# Patient Record
Sex: FEMALE | Race: WHITE | NOT HISPANIC OR LATINO | Employment: OTHER | URBAN - NONMETROPOLITAN AREA
[De-identification: names, ages, dates, MRNs, and addresses within clinical notes are randomized per-mention and may not be internally consistent; named-entity substitution may affect disease eponyms.]

---

## 2023-07-17 PROBLEM — M43.02 CERVICAL SPONDYLOLYSIS: Status: ACTIVE | Noted: 2023-07-17

## 2023-07-17 PROBLEM — E78.49 FAMILIAL HYPERLIPIDEMIA: Status: ACTIVE | Noted: 2023-07-17

## 2023-07-17 PROBLEM — F32.1 DEPRESSION, MAJOR, SINGLE EPISODE, MODERATE (MULTI): Status: ACTIVE | Noted: 2023-07-17

## 2023-07-17 PROBLEM — M47.16 SPONDYLOSIS, LUMBAR, WITH MYELOPATHY: Status: ACTIVE | Noted: 2023-07-17

## 2023-07-17 PROBLEM — R53.83 FATIGUE: Status: ACTIVE | Noted: 2023-07-17

## 2023-07-17 PROBLEM — K58.9 IBS (IRRITABLE BOWEL SYNDROME): Status: ACTIVE | Noted: 2023-07-17

## 2023-07-17 PROBLEM — K21.9 GERD (GASTROESOPHAGEAL REFLUX DISEASE): Status: ACTIVE | Noted: 2023-07-17

## 2023-07-17 PROBLEM — G62.9 PERIPHERAL NEUROPATHY: Status: ACTIVE | Noted: 2023-07-17

## 2023-07-17 PROBLEM — E04.1 THYROID NODULE: Status: ACTIVE | Noted: 2023-07-17

## 2023-07-17 PROBLEM — M62.50 MUSCLE ATROPHY: Status: ACTIVE | Noted: 2023-07-17

## 2023-07-17 PROBLEM — M19.049 OSTEOARTHRITIS OF CARPOMETACARPAL JOINT: Status: ACTIVE | Noted: 2023-07-17

## 2023-07-17 PROBLEM — M54.2 NECK PAIN: Status: ACTIVE | Noted: 2023-07-17

## 2023-07-17 PROBLEM — R60.9 EDEMA: Status: ACTIVE | Noted: 2023-07-17

## 2023-07-17 PROBLEM — J30.9 CHRONIC ALLERGIC RHINITIS: Status: ACTIVE | Noted: 2023-07-17

## 2023-07-17 PROBLEM — M85.80 OSTEOPENIA: Status: ACTIVE | Noted: 2023-07-17

## 2023-07-17 PROBLEM — I83.93 VARICOSE VEINS OF LEGS: Status: ACTIVE | Noted: 2023-07-17

## 2023-07-17 RX ORDER — CYCLOBENZAPRINE HCL 5 MG
5 TABLET ORAL NIGHTLY
COMMUNITY
Start: 2022-09-06 | End: 2024-02-05 | Stop reason: ALTCHOICE

## 2023-07-17 RX ORDER — MULTIVITAMIN
1 TABLET ORAL DAILY
COMMUNITY
Start: 2019-11-09

## 2023-07-17 RX ORDER — CALCIUM CARBONATE 600 MG
1 TABLET ORAL DAILY
COMMUNITY
Start: 2020-07-21 | End: 2024-02-05 | Stop reason: ALTCHOICE

## 2023-07-17 RX ORDER — CHOLECALCIFEROL (VITAMIN D3) 25 MCG
1 TABLET ORAL DAILY
COMMUNITY
Start: 2020-07-21

## 2023-07-17 RX ORDER — MULTIVIT-MIN/IRON/FOLIC ACID/K 18-600-40
1 CAPSULE ORAL DAILY
COMMUNITY
Start: 2019-11-09

## 2023-07-17 RX ORDER — LANOLIN ALCOHOL/MO/W.PET/CERES
1 CREAM (GRAM) TOPICAL DAILY
COMMUNITY
Start: 2020-07-21

## 2023-07-17 RX ORDER — FAMOTIDINE 40 MG/1
1 TABLET, FILM COATED ORAL NIGHTLY
COMMUNITY
Start: 2020-07-21 | End: 2023-08-04 | Stop reason: SDUPTHER

## 2023-07-18 ENCOUNTER — APPOINTMENT (OUTPATIENT)
Dept: PRIMARY CARE | Facility: CLINIC | Age: 73
End: 2023-07-18
Payer: MEDICARE

## 2023-08-04 ENCOUNTER — OFFICE VISIT (OUTPATIENT)
Dept: PRIMARY CARE | Facility: CLINIC | Age: 73
End: 2023-08-04
Payer: MEDICARE

## 2023-08-04 VITALS
OXYGEN SATURATION: 95 % | SYSTOLIC BLOOD PRESSURE: 110 MMHG | HEIGHT: 65 IN | BODY MASS INDEX: 22.82 KG/M2 | HEART RATE: 66 BPM | WEIGHT: 137 LBS | DIASTOLIC BLOOD PRESSURE: 70 MMHG

## 2023-08-04 DIAGNOSIS — K58.9 IRRITABLE BOWEL SYNDROME WITHOUT DIARRHEA: ICD-10-CM

## 2023-08-04 DIAGNOSIS — M43.02 CERVICAL SPONDYLOLYSIS: ICD-10-CM

## 2023-08-04 DIAGNOSIS — K21.9 GASTROESOPHAGEAL REFLUX DISEASE, UNSPECIFIED WHETHER ESOPHAGITIS PRESENT: ICD-10-CM

## 2023-08-04 DIAGNOSIS — G62.89 OTHER POLYNEUROPATHY: ICD-10-CM

## 2023-08-04 DIAGNOSIS — R60.0 LOCALIZED EDEMA: ICD-10-CM

## 2023-08-04 DIAGNOSIS — R53.82 CHRONIC FATIGUE: ICD-10-CM

## 2023-08-04 DIAGNOSIS — Z12.31 SCREENING MAMMOGRAM, ENCOUNTER FOR: ICD-10-CM

## 2023-08-04 DIAGNOSIS — M62.521 ATROPHY OF MUSCLE OF RIGHT UPPER ARM: ICD-10-CM

## 2023-08-04 DIAGNOSIS — M47.16 SPONDYLOSIS, LUMBAR, WITH MYELOPATHY: ICD-10-CM

## 2023-08-04 DIAGNOSIS — M85.80 OSTEOPENIA, UNSPECIFIED LOCATION: ICD-10-CM

## 2023-08-04 DIAGNOSIS — J30.9 CHRONIC ALLERGIC RHINITIS: Primary | ICD-10-CM

## 2023-08-04 DIAGNOSIS — M19.049 OSTEOARTHRITIS OF CARPOMETACARPAL JOINT: ICD-10-CM

## 2023-08-04 DIAGNOSIS — E78.49 FAMILIAL HYPERLIPIDEMIA: ICD-10-CM

## 2023-08-04 PROBLEM — M43.10 ACQUIRED SPONDYLOLISTHESIS: Status: ACTIVE | Noted: 2019-10-03

## 2023-08-04 PROBLEM — F32.1 DEPRESSION, MAJOR, SINGLE EPISODE, MODERATE (MULTI): Status: RESOLVED | Noted: 2023-07-17 | Resolved: 2023-08-04

## 2023-08-04 PROBLEM — H43.393 VITREOUS FLOATERS OF BOTH EYES: Status: ACTIVE | Noted: 2017-01-25

## 2023-08-04 PROBLEM — M54.2 NECK PAIN: Status: RESOLVED | Noted: 2023-07-17 | Resolved: 2023-08-04

## 2023-08-04 PROBLEM — M43.17 ACQUIRED SPONDYLOLISTHESIS OF LUMBOSACRAL REGION: Status: ACTIVE | Noted: 2019-10-03

## 2023-08-04 PROBLEM — R60.9 EDEMA: Status: RESOLVED | Noted: 2023-07-17 | Resolved: 2023-08-04

## 2023-08-04 PROCEDURE — 1160F RVW MEDS BY RX/DR IN RCRD: CPT | Performed by: FAMILY MEDICINE

## 2023-08-04 PROCEDURE — 1036F TOBACCO NON-USER: CPT | Performed by: FAMILY MEDICINE

## 2023-08-04 PROCEDURE — 1159F MED LIST DOCD IN RCRD: CPT | Performed by: FAMILY MEDICINE

## 2023-08-04 PROCEDURE — 1157F ADVNC CARE PLAN IN RCRD: CPT | Performed by: FAMILY MEDICINE

## 2023-08-04 PROCEDURE — 99214 OFFICE O/P EST MOD 30 MIN: CPT | Performed by: FAMILY MEDICINE

## 2023-08-04 RX ORDER — FAMOTIDINE 40 MG/1
40 TABLET, FILM COATED ORAL NIGHTLY
Qty: 90 TABLET | Refills: 3 | Status: SHIPPED | OUTPATIENT
Start: 2023-08-04 | End: 2024-08-03

## 2023-08-04 RX ORDER — AMOXICILLIN 250 MG
1 CAPSULE ORAL DAILY PRN
COMMUNITY
End: 2024-02-05 | Stop reason: ALTCHOICE

## 2023-08-04 ASSESSMENT — PATIENT HEALTH QUESTIONNAIRE - PHQ9
2. FEELING DOWN, DEPRESSED OR HOPELESS: NOT AT ALL
SUM OF ALL RESPONSES TO PHQ9 QUESTIONS 1 AND 2: 0
1. LITTLE INTEREST OR PLEASURE IN DOING THINGS: NOT AT ALL

## 2023-08-04 NOTE — PROGRESS NOTES
Subjective   Patient ID: Meron Silverio is a 72 y.o. female who presents for Med Management.    HPI   Additional Information: [facility] Surrogate.   Cervical spondylosis with myelopathy - has been crunchy as usual. Some pain in shoulders. More of all her pains. Surgery made her pills hard to swallow.  Last month had a spell of spasms.       Chronic rhinitis - not an issue . No meds except saline every night     EDEMA - good in winter but puffy in summer heat.      Familial hyperlipidemia - she was on a low calorie diet and had TC of 229 but HDL of 91 ASCVD risk 6.7%. Stopped  [facility] due to back then COVID-19. Will get back but so far not able to get in or walking. Now exercises and weights at home daily. Gardening and weeding.       Fatigue - still exhausted. Does not sleep well with the pain and potty breaks. Good TSH last time.      Generalized osteoarthrosis, involving multiple sites - all pains seem to be worse with staying active. Hands are the worst. Dr Mendoza and will have CMC  arthroplasty on both hands but holding for now as that is the least of her pains. .      GERD - No HB, Melena, dysphagia, or hematochezia. On famotidine daily works well.      History of melanoma MARY in August and annual checks at [facility].      Irritable bowel syndrome - has been good with some increase in gas and diet is better. Mostly eats and then has a build up that passes and she is fine till next meal.       Muscle atrophy - Still weak in right arm and hands. Feels like muscle have crepitance. Does not seem like the weights have been helping but may be maintaining .      Osteopenia - was to be on Boniva in the past and could not take it. Jaw pain. DEXA in August 19, 2022 and 7.7/ 0.7% FRAX      Peripheral Neuropathy - feet getting hot and burning. Worse if in shoes. Not red or swollen or warm except with veins at ankles. Gabapentin made her loopy. Not keeping awake at hs      Personal history of colonic  "polyps - scope July 2019. 2 polyps. Due in 2024.      Spondylosis, lumbar, with myelopathy - Saw Dr Lopez then Dr Cassidy. PT really helped for spinal stenosis. She will continue exercises. Has spondylolisthesis.     Thyroid nodule - good TSH last  time. Got smaller in 2014      Varicose veins of legs - still has them and so purple ankles      Memory loss - grasping for words. OK with dates. Names are OK      Mammogram 8/19/22   DEXA 8/19/22  Colonoscopy 7/10/19   Pneumovax UTD     Review of Systems    Objective   /70 (BP Location: Left arm, Patient Position: Sitting)   Pulse 66   Ht 1.657 m (5' 5.25\")   Wt 62.1 kg (137 lb)   SpO2 95%   BMI 22.62 kg/m²     Physical Exam  Vitals reviewed.   Constitutional:       General: She is not in acute distress.     Appearance: Normal appearance.   HENT:      Head: Normocephalic.      Right Ear: Tympanic membrane, ear canal and external ear normal.      Left Ear: Tympanic membrane, ear canal and external ear normal.      Nose: Nose normal.      Mouth/Throat:      Pharynx: Oropharynx is clear.   Eyes:      Extraocular Movements: Extraocular movements intact.      Conjunctiva/sclera: Conjunctivae normal.      Pupils: Pupils are equal, round, and reactive to light.   Neck:      Vascular: No carotid bruit.   Cardiovascular:      Rate and Rhythm: Normal rate and regular rhythm.      Pulses: Normal pulses.      Heart sounds: Normal heart sounds. No murmur heard.  Pulmonary:      Effort: Pulmonary effort is normal. No respiratory distress.      Breath sounds: Normal breath sounds.   Abdominal:      General: Abdomen is flat. Bowel sounds are normal. There is no distension.      Palpations: Abdomen is soft. There is no mass.      Tenderness: There is no abdominal tenderness.   Musculoskeletal:         General: Tenderness (paracervical and lumbar with tightness of traps) and deformity (Heberden's nodes of multiple fingers and bony prominence of the CMC) present. Normal range of " motion.      Cervical back: Normal range of motion and neck supple. No tenderness.      Comments: Crepitence of neck    Lymphadenopathy:      Cervical: No cervical adenopathy.   Skin:     General: Skin is warm and dry.      Findings: No rash.   Neurological:      General: No focal deficit present.      Mental Status: She is alert and oriented to person, place, and time.   Psychiatric:         Mood and Affect: Mood normal.         Thought Content: Thought content normal.         Judgment: Judgment normal.         Assessment/Plan   Problem List Items Addressed This Visit       Cervical spondylolysis    Relevant Orders    Follow Up In Primary Care - Established    Chronic allergic rhinitis - Primary    Familial hyperlipidemia    Relevant Orders    Lipid Panel    TSH with reflex to Free T4 if abnormal    Fatigue    GERD (gastroesophageal reflux disease)    Relevant Medications    famotidine (Pepcid) 40 mg tablet    IBS (irritable bowel syndrome)    Relevant Orders    Comprehensive Metabolic Panel    CBC    Localized edema    Muscle atrophy    Osteoarthritis of carpometacarpal joint    Osteopenia    Peripheral neuropathy    Spondylosis, lumbar, with myelopathy     Other Visit Diagnoses       Screening mammogram, encounter for        Relevant Orders    BI mammo bilateral screening tomosynthesis

## 2023-08-30 ENCOUNTER — TELEPHONE (OUTPATIENT)
Dept: PRIMARY CARE | Facility: CLINIC | Age: 73
End: 2023-08-30
Payer: MEDICARE

## 2024-01-31 ENCOUNTER — LAB (OUTPATIENT)
Dept: LAB | Facility: LAB | Age: 74
End: 2024-01-31
Payer: MEDICARE

## 2024-01-31 DIAGNOSIS — K58.9 IRRITABLE BOWEL SYNDROME WITHOUT DIARRHEA: ICD-10-CM

## 2024-01-31 DIAGNOSIS — E78.49 FAMILIAL HYPERLIPIDEMIA: ICD-10-CM

## 2024-01-31 LAB
ALBUMIN SERPL BCP-MCNC: 4.2 G/DL (ref 3.4–5)
ALP SERPL-CCNC: 86 U/L (ref 33–136)
ALT SERPL W P-5'-P-CCNC: 17 U/L (ref 7–45)
ANION GAP SERPL CALC-SCNC: 9 MMOL/L (ref 10–20)
AST SERPL W P-5'-P-CCNC: 19 U/L (ref 9–39)
BILIRUB SERPL-MCNC: 0.5 MG/DL (ref 0–1.2)
BUN SERPL-MCNC: 13 MG/DL (ref 6–23)
CALCIUM SERPL-MCNC: 9.5 MG/DL (ref 8.6–10.3)
CHLORIDE SERPL-SCNC: 106 MMOL/L (ref 98–107)
CHOLEST SERPL-MCNC: 229 MG/DL (ref 0–199)
CHOLESTEROL/HDL RATIO: 2.7
CO2 SERPL-SCNC: 31 MMOL/L (ref 21–32)
CREAT SERPL-MCNC: 0.78 MG/DL (ref 0.5–1.05)
EGFRCR SERPLBLD CKD-EPI 2021: 81 ML/MIN/1.73M*2
ERYTHROCYTE [DISTWIDTH] IN BLOOD BY AUTOMATED COUNT: 12.6 % (ref 11.5–14.5)
GLUCOSE SERPL-MCNC: 88 MG/DL (ref 74–99)
HCT VFR BLD AUTO: 42.7 % (ref 36–46)
HDLC SERPL-MCNC: 84 MG/DL
HGB BLD-MCNC: 13.7 G/DL (ref 12–16)
LDLC SERPL CALC-MCNC: 130 MG/DL
MCH RBC QN AUTO: 31.1 PG (ref 26–34)
MCHC RBC AUTO-ENTMCNC: 32.1 G/DL (ref 32–36)
MCV RBC AUTO: 97 FL (ref 80–100)
NON HDL CHOLESTEROL: 145 MG/DL (ref 0–149)
NRBC BLD-RTO: 0 /100 WBCS (ref 0–0)
PLATELET # BLD AUTO: 345 X10*3/UL (ref 150–450)
POTASSIUM SERPL-SCNC: 4.2 MMOL/L (ref 3.5–5.3)
PROT SERPL-MCNC: 7 G/DL (ref 6.4–8.2)
RBC # BLD AUTO: 4.41 X10*6/UL (ref 4–5.2)
SODIUM SERPL-SCNC: 142 MMOL/L (ref 136–145)
TRIGL SERPL-MCNC: 74 MG/DL (ref 0–149)
TSH SERPL-ACNC: 1.45 MIU/L (ref 0.44–3.98)
VLDL: 15 MG/DL (ref 0–40)
WBC # BLD AUTO: 6.1 X10*3/UL (ref 4.4–11.3)

## 2024-01-31 PROCEDURE — 85027 COMPLETE CBC AUTOMATED: CPT

## 2024-01-31 PROCEDURE — 80053 COMPREHEN METABOLIC PANEL: CPT

## 2024-01-31 PROCEDURE — 80061 LIPID PANEL: CPT

## 2024-01-31 PROCEDURE — 84443 ASSAY THYROID STIM HORMONE: CPT

## 2024-01-31 PROCEDURE — 36415 COLL VENOUS BLD VENIPUNCTURE: CPT

## 2024-02-05 ENCOUNTER — OFFICE VISIT (OUTPATIENT)
Dept: PRIMARY CARE | Facility: CLINIC | Age: 74
End: 2024-02-05
Payer: MEDICARE

## 2024-02-05 VITALS
OXYGEN SATURATION: 95 % | BODY MASS INDEX: 22.98 KG/M2 | HEIGHT: 66 IN | SYSTOLIC BLOOD PRESSURE: 130 MMHG | WEIGHT: 143 LBS | HEART RATE: 55 BPM | DIASTOLIC BLOOD PRESSURE: 80 MMHG

## 2024-02-05 DIAGNOSIS — M43.02 CERVICAL SPONDYLOLYSIS: ICD-10-CM

## 2024-02-05 DIAGNOSIS — E78.49 FAMILIAL HYPERLIPIDEMIA: ICD-10-CM

## 2024-02-05 DIAGNOSIS — M62.521 ATROPHY OF MUSCLE OF RIGHT UPPER ARM: ICD-10-CM

## 2024-02-05 DIAGNOSIS — I83.93 ASYMPTOMATIC VARICOSE VEINS OF BOTH LOWER EXTREMITIES: ICD-10-CM

## 2024-02-05 DIAGNOSIS — E04.1 THYROID NODULE: ICD-10-CM

## 2024-02-05 DIAGNOSIS — K58.9 IRRITABLE BOWEL SYNDROME WITHOUT DIARRHEA: ICD-10-CM

## 2024-02-05 DIAGNOSIS — M43.17 ACQUIRED SPONDYLOLISTHESIS OF LUMBOSACRAL REGION: ICD-10-CM

## 2024-02-05 DIAGNOSIS — R53.82 CHRONIC FATIGUE: ICD-10-CM

## 2024-02-05 DIAGNOSIS — Z00.00 ROUTINE GENERAL MEDICAL EXAMINATION AT HEALTH CARE FACILITY: Primary | ICD-10-CM

## 2024-02-05 DIAGNOSIS — Z87.891 FORMER SMOKER: ICD-10-CM

## 2024-02-05 DIAGNOSIS — M85.80 OSTEOPENIA, UNSPECIFIED LOCATION: ICD-10-CM

## 2024-02-05 DIAGNOSIS — M47.16 SPONDYLOSIS, LUMBAR, WITH MYELOPATHY: ICD-10-CM

## 2024-02-05 DIAGNOSIS — G62.89 OTHER POLYNEUROPATHY: ICD-10-CM

## 2024-02-05 DIAGNOSIS — M19.049 OSTEOARTHRITIS OF CARPOMETACARPAL JOINT: ICD-10-CM

## 2024-02-05 DIAGNOSIS — K21.9 GASTROESOPHAGEAL REFLUX DISEASE WITHOUT ESOPHAGITIS: ICD-10-CM

## 2024-02-05 DIAGNOSIS — J30.9 CHRONIC ALLERGIC RHINITIS: ICD-10-CM

## 2024-02-05 DIAGNOSIS — R60.0 LOCALIZED EDEMA: ICD-10-CM

## 2024-02-05 PROBLEM — H43.393 VITREOUS FLOATERS OF BOTH EYES: Status: RESOLVED | Noted: 2017-01-25 | Resolved: 2024-02-05

## 2024-02-05 PROCEDURE — 1036F TOBACCO NON-USER: CPT | Performed by: FAMILY MEDICINE

## 2024-02-05 PROCEDURE — 1158F ADVNC CARE PLAN TLK DOCD: CPT | Performed by: FAMILY MEDICINE

## 2024-02-05 PROCEDURE — G0439 PPPS, SUBSEQ VISIT: HCPCS | Performed by: FAMILY MEDICINE

## 2024-02-05 PROCEDURE — 1159F MED LIST DOCD IN RCRD: CPT | Performed by: FAMILY MEDICINE

## 2024-02-05 PROCEDURE — 99214 OFFICE O/P EST MOD 30 MIN: CPT | Performed by: FAMILY MEDICINE

## 2024-02-05 PROCEDURE — 1170F FXNL STATUS ASSESSED: CPT | Performed by: FAMILY MEDICINE

## 2024-02-05 PROCEDURE — 1157F ADVNC CARE PLAN IN RCRD: CPT | Performed by: FAMILY MEDICINE

## 2024-02-05 PROCEDURE — 1160F RVW MEDS BY RX/DR IN RCRD: CPT | Performed by: FAMILY MEDICINE

## 2024-02-05 PROCEDURE — 1123F ACP DISCUSS/DSCN MKR DOCD: CPT | Performed by: FAMILY MEDICINE

## 2024-02-05 ASSESSMENT — ACTIVITIES OF DAILY LIVING (ADL)
DRESSING: INDEPENDENT
MANAGING_FINANCES: INDEPENDENT
GROCERY_SHOPPING: INDEPENDENT
DOING_HOUSEWORK: INDEPENDENT
BATHING: INDEPENDENT
TAKING_MEDICATION: INDEPENDENT

## 2024-02-05 ASSESSMENT — PATIENT HEALTH QUESTIONNAIRE - PHQ9
SUM OF ALL RESPONSES TO PHQ9 QUESTIONS 1 AND 2: 0
1. LITTLE INTEREST OR PLEASURE IN DOING THINGS: NOT AT ALL
SUM OF ALL RESPONSES TO PHQ9 QUESTIONS 1 AND 2: 0
1. LITTLE INTEREST OR PLEASURE IN DOING THINGS: NOT AT ALL
2. FEELING DOWN, DEPRESSED OR HOPELESS: NOT AT ALL
2. FEELING DOWN, DEPRESSED OR HOPELESS: NOT AT ALL

## 2024-02-05 NOTE — PROGRESS NOTES
Subjective   Reason for Visit: Meron Silverio is an 73 y.o. female here for a Medicare Wellness visit.     Past Medical, Surgical, and Family History reviewed and updated in chart.    Reviewed all medications by prescribing practitioner or clinical pharmacist (such as prescriptions, OTCs, herbal therapies and supplements) and documented in the medical record.    HPI  Cervical spondylosis with myelopathy - has been crunchy as usual. Some pain in shoulders. More of all her pains. Surgery made her pills hard to swallow.  Last month had a spell of sharp pains. Can try Salon Pas or Blue Emu     Chronic rhinitis - not an issue . No meds except saline every night     EDEMA - good in winter but puffy in summer heat. CMP WNL. Abraded the leg 3 weeks ago and slow healing.      Familial hyperlipidemia -  TC of 229 but HDL of 84  ASCVD risk 7.7%%.  Exercises and weights at home daily. Gardening and weeding in the summer.        Fatigue - still exhausted. Does not sleep well with the pain and potty breaks. Good TSH last year      Generalized osteoarthrosis, involving multiple sites - all pains seem to be worse with staying active or weather. Hands are the worst. Dr Mendoza and will have C[facility]  arthroplasty on both hands but holding for now as that is the least of her pains. .      GERD - No HB, Melena, dysphagia, or hematochezia. On famotidine daily works well.      History of melanoma MARY in August and annual checks at [facility].      Irritable bowel syndrome - has been good with some increase in gas and diet is better. Mostly eats and then has a build up that passes and she is fine till next meal.       Muscle atrophy - Still weak in right arm and hands. Feels like muscle have crepitance. Does not seem like the weights have been helping but may be maintaining.     Osteopenia - was to be on Boniva in the past and could not take it. Jaw pain. DEXA in August 19, 2022 and 7.7/ 0.7% FRAX so not needed      Peripheral Neuropathy  "- feet getting hot and burning. Worse if in shoes. Not red or swollen or warm except with veins at ankles. Gabapentin made her loopy. Not keeping awake at hs      Personal history of colonic polyps - scope July 2019. 2 polyps. Due in 2024.      Spondylosis, lumbar, with myelopathy - Saw Dr Lopez then Dr Cassidy. PT really helped for spinal stenosis. She will continue exercises. Has spondylolisthesis.     Thyroid nodule - good TSH.   Got smaller in 2014      Varicose veins of legs - still has them and so purple ankles      Memory loss - grasping for words. OK with dates. Names can be an issue.      Mammogram 8/4/23   DEXA 8/19/22  Colonoscopy 7/10/19   Pneumovax UTD   LW and DPA  Bijan yes   CT screening  lung 2/7/23 for 40 PY Quit 2013     Patient Care Team:  Jay Espinosa MD as PCP - General  Jay Espinosa MD as PCP - [facility] ACO Attributed Provider     Review of Systems    Objective   Vitals:  /80 (BP Location: Left arm, Patient Position: Sitting)   Pulse 55   Ht 1.664 m (5' 5.5\")   Wt 64.9 kg (143 lb)   SpO2 95%   BMI 23.43 kg/m²       Physical Exam  Vitals reviewed.   Constitutional:       General: She is not in acute distress.     Appearance: Normal appearance.   HENT:      Head: Normocephalic.      Right Ear: Tympanic membrane, ear canal and external ear normal.      Left Ear: Tympanic membrane, ear canal and external ear normal.      Nose: Nose normal.      Mouth/Throat:      Pharynx: Oropharynx is clear.   Eyes:      Extraocular Movements: Extraocular movements intact.      Conjunctiva/sclera: Conjunctivae normal.      Pupils: Pupils are equal, round, and reactive to light.   Neck:      Vascular: No carotid bruit.   Cardiovascular:      Rate and Rhythm: Normal rate and regular rhythm.      Pulses: Normal pulses.      Heart sounds: Normal heart sounds. No murmur heard.  Pulmonary:      Effort: Pulmonary effort is normal. No respiratory distress.      Breath sounds: Normal breath sounds. "   Abdominal:      General: Abdomen is flat. Bowel sounds are normal. There is no distension.      Palpations: Abdomen is soft. There is no mass.      Tenderness: There is no abdominal tenderness.   Musculoskeletal:      Cervical back: Normal range of motion and neck supple. No tenderness.      Comments: Heberdens with inturning of little left and both index fingers   Neck with with good ROM but pain with rotation left. Crepitence with motion of right knee    Lymphadenopathy:      Cervical: No cervical adenopathy.   Skin:     General: Skin is warm and dry.      Findings: No rash.   Neurological:      General: No focal deficit present.      Mental Status: She is alert and oriented to person, place, and time.   Psychiatric:         Mood and Affect: Mood normal.         Thought Content: Thought content normal.         Judgment: Judgment normal.         Assessment/Plan   Problem List Items Addressed This Visit       Acquired spondylolisthesis of lumbosacral region    Cervical spondylolysis    Relevant Orders    Follow Up In Primary Care - Established    Chronic allergic rhinitis    Familial hyperlipidemia    Fatigue    GERD (gastroesophageal reflux disease)    IBS (irritable bowel syndrome)    Localized edema    Muscle atrophy    Osteoarthritis of carpometacarpal joint    Osteopenia    Peripheral neuropathy    Spondylosis, lumbar, with myelopathy    Thyroid nodule    Varicose veins of legs     Other Visit Diagnoses       Routine general medical examination at health care facility    -  Primary    Former smoker        Relevant Orders    CT lung screening low dose

## 2024-02-13 ENCOUNTER — HOSPITAL ENCOUNTER (OUTPATIENT)
Dept: RADIOLOGY | Facility: HOSPITAL | Age: 74
Discharge: HOME | End: 2024-02-13
Payer: MEDICARE

## 2024-02-13 DIAGNOSIS — Z87.891 FORMER SMOKER: ICD-10-CM

## 2024-02-13 PROCEDURE — 71271 CT THORAX LUNG CANCER SCR C-: CPT

## 2024-08-05 ENCOUNTER — APPOINTMENT (OUTPATIENT)
Dept: PRIMARY CARE | Facility: CLINIC | Age: 74
End: 2024-08-05
Payer: MEDICARE

## 2024-08-05 VITALS
WEIGHT: 141 LBS | OXYGEN SATURATION: 97 % | BODY MASS INDEX: 22.66 KG/M2 | HEART RATE: 60 BPM | SYSTOLIC BLOOD PRESSURE: 130 MMHG | DIASTOLIC BLOOD PRESSURE: 80 MMHG | HEIGHT: 66 IN

## 2024-08-05 DIAGNOSIS — M19.049 OSTEOARTHRITIS OF CARPOMETACARPAL JOINT: ICD-10-CM

## 2024-08-05 DIAGNOSIS — Z12.31 SCREENING MAMMOGRAM, ENCOUNTER FOR: ICD-10-CM

## 2024-08-05 DIAGNOSIS — M62.521 ATROPHY OF MUSCLE OF RIGHT UPPER ARM: ICD-10-CM

## 2024-08-05 DIAGNOSIS — K21.9 GASTROESOPHAGEAL REFLUX DISEASE, UNSPECIFIED WHETHER ESOPHAGITIS PRESENT: ICD-10-CM

## 2024-08-05 DIAGNOSIS — M85.80 OSTEOPENIA, UNSPECIFIED LOCATION: ICD-10-CM

## 2024-08-05 DIAGNOSIS — R53.82 CHRONIC FATIGUE: ICD-10-CM

## 2024-08-05 DIAGNOSIS — J30.9 CHRONIC ALLERGIC RHINITIS: ICD-10-CM

## 2024-08-05 DIAGNOSIS — M43.17 ACQUIRED SPONDYLOLISTHESIS OF LUMBOSACRAL REGION: ICD-10-CM

## 2024-08-05 DIAGNOSIS — K58.9 IRRITABLE BOWEL SYNDROME WITHOUT DIARRHEA: ICD-10-CM

## 2024-08-05 DIAGNOSIS — E78.49 FAMILIAL HYPERLIPIDEMIA: ICD-10-CM

## 2024-08-05 DIAGNOSIS — R60.0 LOCALIZED EDEMA: Primary | ICD-10-CM

## 2024-08-05 DIAGNOSIS — Z78.0 MENOPAUSE: ICD-10-CM

## 2024-08-05 DIAGNOSIS — E04.1 THYROID NODULE: ICD-10-CM

## 2024-08-05 DIAGNOSIS — Z87.891 FORMER SMOKER: ICD-10-CM

## 2024-08-05 DIAGNOSIS — M47.16 SPONDYLOSIS, LUMBAR, WITH MYELOPATHY: ICD-10-CM

## 2024-08-05 DIAGNOSIS — M43.02 CERVICAL SPONDYLOLYSIS: ICD-10-CM

## 2024-08-05 DIAGNOSIS — I83.93 ASYMPTOMATIC VARICOSE VEINS OF BOTH LOWER EXTREMITIES: ICD-10-CM

## 2024-08-05 PROCEDURE — 1157F ADVNC CARE PLAN IN RCRD: CPT | Performed by: FAMILY MEDICINE

## 2024-08-05 PROCEDURE — 1160F RVW MEDS BY RX/DR IN RCRD: CPT | Performed by: FAMILY MEDICINE

## 2024-08-05 PROCEDURE — 3008F BODY MASS INDEX DOCD: CPT | Performed by: FAMILY MEDICINE

## 2024-08-05 PROCEDURE — 1036F TOBACCO NON-USER: CPT | Performed by: FAMILY MEDICINE

## 2024-08-05 PROCEDURE — 1159F MED LIST DOCD IN RCRD: CPT | Performed by: FAMILY MEDICINE

## 2024-08-05 PROCEDURE — 99214 OFFICE O/P EST MOD 30 MIN: CPT | Performed by: FAMILY MEDICINE

## 2024-08-05 RX ORDER — FAMOTIDINE 40 MG/1
40 TABLET, FILM COATED ORAL NIGHTLY
Qty: 90 TABLET | Refills: 3 | Status: SHIPPED | OUTPATIENT
Start: 2024-08-05 | End: 2025-08-05

## 2024-08-05 RX ORDER — FUROSEMIDE 20 MG/1
20 TABLET ORAL DAILY PRN
Qty: 30 TABLET | Refills: 2 | Status: SHIPPED | OUTPATIENT
Start: 2024-08-05 | End: 2025-08-05

## 2024-08-05 ASSESSMENT — PATIENT HEALTH QUESTIONNAIRE - PHQ9
1. LITTLE INTEREST OR PLEASURE IN DOING THINGS: NOT AT ALL
2. FEELING DOWN, DEPRESSED OR HOPELESS: NOT AT ALL
SUM OF ALL RESPONSES TO PHQ9 QUESTIONS 1 AND 2: 0

## 2024-08-05 NOTE — PROGRESS NOTES
Subjective   Patient ID: Meron Silverio is a 73 y.o. female who presents for Med Management (Feet swelling).    HPI   Cervical spondylosis with myelopathy - has been crunchy as usual with  pain in shoulders. More of all her pains. Surgery made her pills hard to swallow that is better now.  Last month had a spell of sharp pains.  Salon Pas  as needed. Using Blue Emu     Chronic rhinitis - not an issue. No meds except saline every night     EDEMA - good in winter but puffy in summer heat. CMP WNL last time. Abraded the leg 3 weeks ago and slow healing. No diuretic but worried about leaking urine.      Familial hyperlipidemia -  TC of 229 but HDL of 84  ASCVD risk 7.7% 9 January. Exercises and weights at home daily. Gardening and weeding in the summer.   But frustrated due to plants damaging it all.      Fatigue - still exhausted. Does not sleep well with the pain and potty breaks. Good TSH.      Generalized osteoarthrosis, involving multiple sites - all pains seem to be worse with staying active or weather. Hands are the worst. Dr Mendoza and discussed having [facility]  arthroplasty on both hands but holding for now as that is the least of her pains.  she has a large Heberden's on right index. Consider referral to hand surgeon in [place] with       GERD - No HB, Melena, dysphagia, or hematochezia. On famotidine daily works well.      History of melanoma MARY in August and annual checks at [facility].      Irritable bowel syndrome - has been good with some increase in gas and diet is better. Mostly eats and then has a build up that passes and she is fine till next meal.       Muscle atrophy - Still weak in right arm and hands. Feels like muscle have crepitance. Does not seem like the weights have been helping but may be maintaining.     Osteopenia - was to be on Boniva in the past and could not take it. Jaw pain. DEXA in August 19, 2022 and 7.7/ 0.7% FRAX so not needed. Will check this time      Peripheral Neuropathy -  "feet getting hot and burning. Worse if in shoes. Not red or swollen or warm except with veins at ankles. Gabapentin made her loopy. Not keeping awake at hs      Personal history of colonic polyps - scope July 2019. 2 polyps. Due this year and scheduled with Dr Peralta on 8/14     Spondylosis, lumbar, with myelopathy - Saw Dr Lopez then Dr Cassidy. PT really helped for spinal stenosis. She will continue exercises. Has spondylolisthesis.     Thyroid nodule - good TSH time.   Got smaller in 2014      Varicose veins of legs - still has them and so purple ankles      Memory loss - grasping for words. OK with dates. Names can be an issue.      Mammogram 8/4/23   DEXA 8/19/22   Colonoscopy 7/10/19   Pneumovax UTD   LW and DPA  Bijan yes   CT screening  lung 2/7/23 for 40 PY Quit 2013     Review of Systems    Objective   /80 (BP Location: Left arm, Patient Position: Sitting)   Pulse 60   Ht 1.664 m (5' 5.5\")   Wt 64 kg (141 lb)   SpO2 97%   BMI 23.11 kg/m²     Physical Exam  Vitals reviewed.   Constitutional:       General: She is not in acute distress.     Appearance: Normal appearance.   HENT:      Head: Normocephalic.      Right Ear: Ear canal and external ear normal. There is impacted cerumen.      Left Ear: Tympanic membrane, ear canal and external ear normal.      Nose: Nose normal.      Mouth/Throat:      Mouth: Mucous membranes are moist.      Pharynx: Oropharynx is clear.   Eyes:      Extraocular Movements: Extraocular movements intact.      Conjunctiva/sclera: Conjunctivae normal.      Pupils: Pupils are equal, round, and reactive to light.   Neck:      Vascular: No carotid bruit.      Comments: Somewhat decreased ROM with rotation. The neck muscles are bad.   Cardiovascular:      Rate and Rhythm: Normal rate and regular rhythm.      Pulses: Normal pulses.      Heart sounds: Normal heart sounds. No murmur heard.  Pulmonary:      Effort: Pulmonary effort is normal. No respiratory distress.      " Breath sounds: Normal breath sounds.   Abdominal:      General: Abdomen is flat. Bowel sounds are normal. There is no distension.      Palpations: Abdomen is soft. There is no mass.      Tenderness: There is no abdominal tenderness.   Musculoskeletal:         General: Tenderness (thumb CMCs) present.      Cervical back: Normal range of motion and neck supple. No tenderness.   Lymphadenopathy:      Cervical: No cervical adenopathy.   Skin:     General: Skin is warm and dry.      Findings: No rash.   Neurological:      General: No focal deficit present.      Mental Status: She is alert and oriented to person, place, and time.   Psychiatric:         Mood and Affect: Mood normal.         Thought Content: Thought content normal.         Judgment: Judgment normal.         Assessment/Plan   Diagnoses and all orders for this visit:  Localized edema  -     Follow Up In Primary Care - Established; Future  -     furosemide (Lasix) 20 mg tablet; Take 1 tablet (20 mg) by mouth once daily as needed (edema).  Cervical spondylolysis  -     Follow Up In Primary Care - Established  Gastroesophageal reflux disease, unspecified whether esophagitis present  -     famotidine (Pepcid) 40 mg tablet; Take 1 tablet (40 mg) by mouth once daily at bedtime.  -     CBC; Future  Screening mammogram, encounter for  -     BI mammo bilateral screening tomosynthesis; Future  Menopause  -     XR DEXA bone density; Future  Familial hyperlipidemia  -     Comprehensive Metabolic Panel; Future  -     Lipid Panel; Future  Chronic fatigue  Chronic allergic rhinitis  Acquired spondylolisthesis of lumbosacral region  Irritable bowel syndrome without diarrhea  Asymptomatic varicose veins of both lower extremities  Thyroid nodule  -     TSH with reflex to Free T4 if abnormal; Future  Former smoker  -     CT lung screening low dose; Future  Atrophy of muscle of right upper arm  Osteoarthritis of carpometacarpal joint  Osteopenia, unspecified  location  Spondylosis, lumbar, with myelopathy

## 2024-08-26 ENCOUNTER — APPOINTMENT (OUTPATIENT)
Dept: RADIOLOGY | Facility: CLINIC | Age: 74
End: 2024-08-26
Payer: MEDICARE

## 2024-08-26 ENCOUNTER — HOSPITAL ENCOUNTER (OUTPATIENT)
Dept: RADIOLOGY | Facility: CLINIC | Age: 74
End: 2024-08-26
Payer: MEDICARE

## 2024-09-04 ENCOUNTER — HOSPITAL ENCOUNTER (OUTPATIENT)
Dept: RADIOLOGY | Facility: CLINIC | Age: 74
Discharge: HOME | End: 2024-09-04
Payer: MEDICARE

## 2024-09-04 VITALS — HEIGHT: 66 IN | WEIGHT: 140.98 LBS | BODY MASS INDEX: 22.66 KG/M2

## 2024-09-04 DIAGNOSIS — Z78.0 MENOPAUSE: ICD-10-CM

## 2024-09-04 DIAGNOSIS — Z12.31 SCREENING MAMMOGRAM, ENCOUNTER FOR: ICD-10-CM

## 2024-09-04 PROCEDURE — 77063 BREAST TOMOSYNTHESIS BI: CPT | Performed by: RADIOLOGY

## 2024-09-04 PROCEDURE — 77080 DXA BONE DENSITY AXIAL: CPT

## 2024-09-04 PROCEDURE — 77080 DXA BONE DENSITY AXIAL: CPT | Performed by: STUDENT IN AN ORGANIZED HEALTH CARE EDUCATION/TRAINING PROGRAM

## 2024-09-04 PROCEDURE — 77067 SCR MAMMO BI INCL CAD: CPT | Performed by: RADIOLOGY

## 2024-09-04 PROCEDURE — 77067 SCR MAMMO BI INCL CAD: CPT

## 2024-09-04 ASSESSMENT — LIFESTYLE VARIABLES
CURRENT_SMOKER: N
3_OR_MORE_DRINKS_PER_DAY: N

## 2024-09-04 NOTE — RESULT ENCOUNTER NOTE
Still moderate deficiency of calcium of hip and back.  A bit worse than in 2022.    10-year Fracture Risk:  Major Osteoporotic Fracture  9.8  Hip Fracture                        1.7  So no special meds needed just be sure to get Calcium and vitamin D

## 2025-02-05 ENCOUNTER — APPOINTMENT (OUTPATIENT)
Dept: PRIMARY CARE | Facility: CLINIC | Age: 75
End: 2025-02-05
Payer: MEDICARE

## 2025-02-05 VITALS
HEIGHT: 66 IN | SYSTOLIC BLOOD PRESSURE: 124 MMHG | BODY MASS INDEX: 22.69 KG/M2 | OXYGEN SATURATION: 97 % | DIASTOLIC BLOOD PRESSURE: 80 MMHG | HEART RATE: 61 BPM | WEIGHT: 141.2 LBS

## 2025-02-05 DIAGNOSIS — M85.80 OSTEOPENIA, UNSPECIFIED LOCATION: ICD-10-CM

## 2025-02-05 DIAGNOSIS — Z12.31 ENCOUNTER FOR SCREENING MAMMOGRAM FOR MALIGNANT NEOPLASM OF BREAST: ICD-10-CM

## 2025-02-05 DIAGNOSIS — Z00.00 ROUTINE GENERAL MEDICAL EXAMINATION AT HEALTH CARE FACILITY: Primary | ICD-10-CM

## 2025-02-05 DIAGNOSIS — R53.82 CHRONIC FATIGUE: ICD-10-CM

## 2025-02-05 PROCEDURE — G0439 PPPS, SUBSEQ VISIT: HCPCS

## 2025-02-05 PROCEDURE — 1159F MED LIST DOCD IN RCRD: CPT

## 2025-02-05 PROCEDURE — 1170F FXNL STATUS ASSESSED: CPT

## 2025-02-05 PROCEDURE — 3008F BODY MASS INDEX DOCD: CPT

## 2025-02-05 PROCEDURE — 1036F TOBACCO NON-USER: CPT

## 2025-02-05 PROCEDURE — 1157F ADVNC CARE PLAN IN RCRD: CPT

## 2025-02-05 PROCEDURE — 1160F RVW MEDS BY RX/DR IN RCRD: CPT

## 2025-02-05 ASSESSMENT — ENCOUNTER SYMPTOMS
WOUND: 0
PALPITATIONS: 0
DIFFICULTY URINATING: 0
POLYPHAGIA: 0
ARTHRALGIAS: 0
MYALGIAS: 0
RECTAL PAIN: 0
CHEST TIGHTNESS: 0
UNEXPECTED WEIGHT CHANGE: 0
SHORTNESS OF BREATH: 0
NUMBNESS: 0
CONSTIPATION: 0
POLYDIPSIA: 0
TROUBLE SWALLOWING: 0
NERVOUS/ANXIOUS: 0
ABDOMINAL PAIN: 0
CHILLS: 0
HEADACHES: 0
OCCASIONAL FEELINGS OF UNSTEADINESS: 0
FREQUENCY: 0
WEAKNESS: 0
FATIGUE: 0
DIARRHEA: 0
NAUSEA: 0
DIAPHORESIS: 0

## 2025-02-05 ASSESSMENT — ACTIVITIES OF DAILY LIVING (ADL)
DOING_HOUSEWORK: INDEPENDENT
GROCERY_SHOPPING: INDEPENDENT
TAKING_MEDICATION: INDEPENDENT
MANAGING_FINANCES: INDEPENDENT
BATHING: INDEPENDENT
DRESSING: INDEPENDENT

## 2025-02-05 ASSESSMENT — PATIENT HEALTH QUESTIONNAIRE - PHQ9
SUM OF ALL RESPONSES TO PHQ9 QUESTIONS 1 AND 2: 0
2. FEELING DOWN, DEPRESSED OR HOPELESS: NOT AT ALL
1. LITTLE INTEREST OR PLEASURE IN DOING THINGS: NOT AT ALL

## 2025-02-05 NOTE — PROGRESS NOTES
Subjective   Reason for Visit: Meron Silverio is an 74 y.o. female here for a Medicare Wellness visit.     Past Medical, Surgical, and Family History reviewed and updated in chart.    Reviewed all medications by prescribing practitioner or clinical pharmacist (such as prescriptions, OTCs, herbal therapies and supplements) and documented in the medical record.    Patient presents today for annual wellness visit.    Former smoker, greater than 20 pack years: Stopped in 2013.  Previous lung CT showed small scattered nodules, low-dose CT for follow-up scheduled this month.    Peripheral edema: Previous issue, prescribed Lasix.  Has not taken in many months, no other episodes.  Denies cardiovascular symptoms.    Hyperlipidemia: ASCVD risk score 10.9%, no statin therapy.    Osteoarthritis: Heberden's and Fareed's nodes on right and left hand.  Has seen hand specialist in [place]  and was told that no surgical intervention was needed at this time.  She will follow-up with them if they are worsening symptoms.  She still has full function of both hands.    GERD: Symptoms controlled with famotidine.  No exacerbations.    Osteopenia: Unchanged on last 2 DEXA scans, taking calcium and vitamin D supplements.  Will check levels of both.      Preventive health: Mammogram due this year, ordered.  Colonoscopy 2029.  Pneumovax up-to-date.  CT lung this year.                     Patient Care Team:  CASA Suarez-CNP as PCP - General (Family Medicine)  Jay Espinosa MD [facility] P[facility]     Review of Systems   Constitutional:  Negative for chills, diaphoresis, fatigue and unexpected weight change.   HENT:  Negative for dental problem, tinnitus and trouble swallowing.    Eyes:  Negative for visual disturbance.   Respiratory:  Negative for chest tightness and shortness of breath.    Cardiovascular:  Negative for chest pain, palpitations and leg swelling.   Gastrointestinal:  Negative for abdominal  "pain, constipation, diarrhea, nausea and rectal pain.   Endocrine: Negative for polydipsia, polyphagia and polyuria.   Genitourinary:  Negative for difficulty urinating, frequency and urgency.   Musculoskeletal:  Negative for arthralgias and myalgias.   Skin:  Negative for pallor and wound.   Neurological:  Negative for syncope, weakness, numbness and headaches.   Psychiatric/Behavioral:  Negative for suicidal ideas. The patient is not nervous/anxious.        Objective   Vitals:  /80   Pulse 61   Ht 1.664 m (5' 5.51\")   Wt 64 kg (141 lb 3.2 oz)   SpO2 97%   BMI 23.13 kg/m²       Physical Exam  Vitals and nursing note reviewed.   Constitutional:       General: She is not in acute distress.     Appearance: Normal appearance. She is normal weight.   HENT:      Head: Normocephalic.      Nose: Nose normal.      Mouth/Throat:      Mouth: Mucous membranes are moist.      Pharynx: Oropharynx is clear.   Eyes:      General: No scleral icterus.     Pupils: Pupils are equal, round, and reactive to light.   Cardiovascular:      Rate and Rhythm: Normal rate and regular rhythm.      Pulses: Normal pulses.      Heart sounds: Normal heart sounds.   Pulmonary:      Effort: Pulmonary effort is normal.      Breath sounds: Normal breath sounds.   Abdominal:      General: Bowel sounds are normal. There is no distension.      Palpations: Abdomen is soft.   Musculoskeletal:         General: Deformity (Heberden's and Fareed's nodes bilaterally hands) present. Normal range of motion.      Cervical back: Normal range of motion.   Skin:     General: Skin is warm and dry.      Capillary Refill: Capillary refill takes less than 2 seconds.      Coloration: Skin is not jaundiced.   Neurological:      General: No focal deficit present.      Mental Status: She is alert and oriented to person, place, and time. Mental status is at baseline.   Psychiatric:         Mood and Affect: Mood normal.         Behavior: Behavior normal.         " Thought Content: Thought content normal.         Judgment: Judgment normal.         Assessment & Plan  Chronic fatigue    Orders:    TSH with reflex to Free T4 if abnormal; Future    Osteopenia, unspecified location    Orders:    Vitamin D 25-Hydroxy,Total (for eval of Vitamin D levels); Future    Routine general medical examination at health care facility    Orders:    1 Year Follow Up In Primary Care - Wellness Exam; Future    Basic Metabolic Panel; Future    Encounter for screening mammogram for malignant neoplasm of breast    Orders:    BI mammo bilateral screening tomosynthesis; Future

## 2025-02-08 LAB
25(OH)D3+25(OH)D2 SERPL-MCNC: 77 NG/ML (ref 30–100)
ANION GAP SERPL CALCULATED.4IONS-SCNC: 9 MMOL/L (CALC) (ref 7–17)
BUN SERPL-MCNC: 12 MG/DL (ref 7–25)
BUN/CREAT SERPL: NORMAL (CALC) (ref 6–22)
CALCIUM SERPL-MCNC: 9.4 MG/DL (ref 8.6–10.4)
CHLORIDE SERPL-SCNC: 103 MMOL/L (ref 98–110)
CO2 SERPL-SCNC: 27 MMOL/L (ref 20–32)
CREAT SERPL-MCNC: 0.71 MG/DL (ref 0.6–1)
EGFRCR SERPLBLD CKD-EPI 2021: 90 ML/MIN/1.73M2
GLUCOSE SERPL-MCNC: 91 MG/DL (ref 65–99)
POTASSIUM SERPL-SCNC: 4.3 MMOL/L (ref 3.5–5.3)
SODIUM SERPL-SCNC: 139 MMOL/L (ref 135–146)
TSH SERPL-ACNC: 1.45 MIU/L (ref 0.4–4.5)

## 2025-02-13 ENCOUNTER — APPOINTMENT (OUTPATIENT)
Dept: RADIOLOGY | Facility: HOSPITAL | Age: 75
End: 2025-02-13
Payer: MEDICARE

## 2025-02-18 ENCOUNTER — HOSPITAL ENCOUNTER (OUTPATIENT)
Dept: RADIOLOGY | Facility: HOSPITAL | Age: 75
Discharge: HOME | End: 2025-02-18
Payer: MEDICARE

## 2025-02-18 DIAGNOSIS — Z87.891 PERSONAL HISTORY OF NICOTINE DEPENDENCE: ICD-10-CM

## 2025-02-18 PROCEDURE — 71271 CT THORAX LUNG CANCER SCR C-: CPT | Performed by: RADIOLOGY

## 2025-02-18 PROCEDURE — 71271 CT THORAX LUNG CANCER SCR C-: CPT

## 2025-02-19 DIAGNOSIS — R91.8 ABNORMAL CT LUNG SCREENING: Primary | ICD-10-CM

## 2025-02-19 NOTE — PROGRESS NOTES
Called and discussed LDCT results with patient.  Recommend follow-up CT in 6 months for evaluation.  She states that she has been having regular wood fires causing some smoking elation which may be leading to the inflammatory process seen on the CT.  Denies any upper respiratory symptoms, chest pain.  She will let us know if there are any further changes or symptoms and follow-up in August with new LDCT and office visit

## 2025-07-31 ENCOUNTER — TELEPHONE (OUTPATIENT)
Dept: PRIMARY CARE | Facility: CLINIC | Age: 75
End: 2025-07-31
Payer: MEDICARE

## 2025-07-31 NOTE — TELEPHONE ENCOUNTER
Message from patient that she has some poison ivy that's spreading to the genital area.  Please advise

## 2025-07-31 NOTE — TELEPHONE ENCOUNTER
I tried to call her with no answer.  Can you keep trying to get a hold of her.  We typically treat this with an oral steroid but she is allergic to prednisone.  Can you ask her what her allergy is?  Has she ever used a topical steroid without reaction?

## 2025-08-01 ENCOUNTER — HOSPITAL ENCOUNTER (OUTPATIENT)
Dept: RADIOLOGY | Facility: HOSPITAL | Age: 75
Discharge: HOME | End: 2025-08-01
Payer: MEDICARE

## 2025-08-01 ENCOUNTER — TELEPHONE (OUTPATIENT)
Dept: PRIMARY CARE | Facility: CLINIC | Age: 75
End: 2025-08-01
Payer: MEDICARE

## 2025-08-01 DIAGNOSIS — R91.8 ABNORMAL CT LUNG SCREENING: ICD-10-CM

## 2025-08-01 PROCEDURE — 71250 CT THORAX DX C-: CPT

## 2025-08-01 NOTE — TELEPHONE ENCOUNTER
Message from patient that she keeps missing a call from Katie.  She has an appt on Tues with Thaddeus, so it's a non issue now.  Will see you on Tues.

## 2025-08-05 ENCOUNTER — APPOINTMENT (OUTPATIENT)
Dept: PRIMARY CARE | Facility: CLINIC | Age: 75
End: 2025-08-05
Payer: MEDICARE

## 2025-08-05 VITALS
BODY MASS INDEX: 21.28 KG/M2 | OXYGEN SATURATION: 97 % | HEART RATE: 60 BPM | DIASTOLIC BLOOD PRESSURE: 78 MMHG | WEIGHT: 132.4 LBS | HEIGHT: 66 IN | SYSTOLIC BLOOD PRESSURE: 118 MMHG

## 2025-08-05 DIAGNOSIS — Z00.00 ROUTINE GENERAL MEDICAL EXAMINATION AT HEALTH CARE FACILITY: Primary | ICD-10-CM

## 2025-08-05 DIAGNOSIS — R91.1 PULMONARY NODULE SEEN ON IMAGING STUDY: ICD-10-CM

## 2025-08-05 DIAGNOSIS — A49.9 BACTERIAL INFECTION: ICD-10-CM

## 2025-08-05 DIAGNOSIS — L23.7 POISON IVY DERMATITIS: ICD-10-CM

## 2025-08-05 DIAGNOSIS — K21.9 GASTROESOPHAGEAL REFLUX DISEASE, UNSPECIFIED WHETHER ESOPHAGITIS PRESENT: ICD-10-CM

## 2025-08-05 DIAGNOSIS — E78.49 FAMILIAL HYPERLIPIDEMIA: ICD-10-CM

## 2025-08-05 DIAGNOSIS — M85.80 OSTEOPENIA, UNSPECIFIED LOCATION: ICD-10-CM

## 2025-08-05 PROCEDURE — 3008F BODY MASS INDEX DOCD: CPT

## 2025-08-05 PROCEDURE — 1159F MED LIST DOCD IN RCRD: CPT

## 2025-08-05 PROCEDURE — 99214 OFFICE O/P EST MOD 30 MIN: CPT

## 2025-08-05 PROCEDURE — 1160F RVW MEDS BY RX/DR IN RCRD: CPT

## 2025-08-05 PROCEDURE — G0439 PPPS, SUBSEQ VISIT: HCPCS

## 2025-08-05 PROCEDURE — 1170F FXNL STATUS ASSESSED: CPT

## 2025-08-05 RX ORDER — AZITHROMYCIN 250 MG/1
TABLET, FILM COATED ORAL
Qty: 6 TABLET | Refills: 0 | Status: SHIPPED | OUTPATIENT
Start: 2025-08-05

## 2025-08-05 RX ORDER — FAMOTIDINE 40 MG/1
40 TABLET, FILM COATED ORAL NIGHTLY
Qty: 90 TABLET | Refills: 3 | Status: SHIPPED | OUTPATIENT
Start: 2025-08-05 | End: 2026-08-05

## 2025-08-05 RX ORDER — MUPIROCIN 20 MG/G
OINTMENT TOPICAL
COMMUNITY
Start: 2025-06-10

## 2025-08-05 RX ORDER — FLUOROMETHOLONE 1 MG/ML
1 SUSPENSION/ DROPS OPHTHALMIC 3 TIMES DAILY
COMMUNITY
Start: 2025-07-23

## 2025-08-05 ASSESSMENT — ENCOUNTER SYMPTOMS
RECTAL PAIN: 0
CONSTIPATION: 0
WEAKNESS: 0
TROUBLE SWALLOWING: 0
DIAPHORESIS: 0
OCCASIONAL FEELINGS OF UNSTEADINESS: 0
WOUND: 0
HEADACHES: 0
DIARRHEA: 0
SHORTNESS OF BREATH: 0
NERVOUS/ANXIOUS: 0
POLYDIPSIA: 0
POLYPHAGIA: 0
CHEST TIGHTNESS: 0
MYALGIAS: 0
FREQUENCY: 0
ARTHRALGIAS: 0
CHILLS: 0
NAUSEA: 0
ABDOMINAL PAIN: 0
NUMBNESS: 0
UNEXPECTED WEIGHT CHANGE: 0
DIFFICULTY URINATING: 0
FATIGUE: 0
PALPITATIONS: 0

## 2025-08-05 ASSESSMENT — ACTIVITIES OF DAILY LIVING (ADL)
MANAGING_FINANCES: INDEPENDENT
GROCERY_SHOPPING: INDEPENDENT
BATHING: INDEPENDENT
DOING_HOUSEWORK: INDEPENDENT
DRESSING: INDEPENDENT
TAKING_MEDICATION: INDEPENDENT

## 2025-08-05 ASSESSMENT — PATIENT HEALTH QUESTIONNAIRE - PHQ9
1. LITTLE INTEREST OR PLEASURE IN DOING THINGS: NOT AT ALL
SUM OF ALL RESPONSES TO PHQ9 QUESTIONS 1 AND 2: 0
2. FEELING DOWN, DEPRESSED OR HOPELESS: NOT AT ALL

## 2025-08-05 NOTE — PROGRESS NOTES
Subjective   Reason for Visit: Meron Silverio is an 74 y.o. female here for a Medicare Wellness visit.     Past Medical, Surgical, and Family History reviewed and updated in chart.    Reviewed all medications by prescribing practitioner or clinical pharmacist (such as prescriptions, OTCs, herbal therapies and supplements) and documented in the medical record.    Patient presents today for Medicare wellness checkup.    Poison ivy dermatitis: Patient has been treating recently with hydrocortisone and Benadryl topical creams.  This has been effective for treatment.  She does not want a prescription medication at this time.    Pulmonary nodule: On recent CT lung.  Will review in 6 months.  Repeat CT ordered today.  She also had 5 suspicious for bacterial infection.  Did states she had some mild respiratory symptoms recently and would like prophylactic treatment.  Will send [facility] for treatment and repeat CT in 6 months.  Is a past smoker, stopped in 2013.  20+ pack years.    GERD: Famotidine controls symptoms.  Medication refilled today.  No side effects.  No exacerbations.    Osteoarthritis: Heberden's and Fareed's nodes on right and left hand.  Has seen hand specialist in [place] with  and was told that no surgical intervention was needed at this time.  She will follow-up with them if they are worsening symptoms.  She still has full function of both hands.     Osteopenia: Unchanged on last 2 DEXA scans, taking calcium and vitamin D supplements.  Levels therapeutic when checked at last visit     Preventive health: Mammogram due this year, ordered, 9/5/25,  Colonoscopy 2029.  Pneumovax up-to-date.  CT lung this year.                                           Patient Care Team:  CASA Suarez-CNP as PCP - General (Family Medicine)  Jay Espinosa MD as PCP [facility] Attributed Provider     Review of Systems   Constitutional:  Negative for chills, diaphoresis, fatigue and unexpected weight change.   HENT:   "Negative for dental problem, tinnitus and trouble swallowing.    Eyes:  Negative for visual disturbance.   Respiratory:  Negative for chest tightness and shortness of breath.    Cardiovascular:  Negative for chest pain, palpitations and leg swelling.   Gastrointestinal:  Negative for abdominal pain, constipation, diarrhea, nausea and rectal pain.   Endocrine: Negative for polydipsia, polyphagia and polyuria.   Genitourinary:  Negative for difficulty urinating, frequency and urgency.   Musculoskeletal:  Negative for arthralgias and myalgias.   Skin:  Positive for rash. Negative for pallor and wound.   Neurological:  Negative for syncope, weakness, numbness and headaches.   Psychiatric/Behavioral:  Negative for suicidal ideas. The patient is not nervous/anxious.        Objective   Vitals:  /78 (BP Location: Left arm, Patient Position: Sitting)   Pulse 60   Ht 1.664 m (5' 5.51\")   Wt 60.1 kg (132 lb 6.4 oz)   SpO2 97%   BMI 21.69 kg/m²       Physical Exam  Constitutional:       Appearance: Normal appearance.   HENT:      Head: Normocephalic.      Mouth/Throat:      Mouth: Mucous membranes are moist.      Pharynx: Oropharynx is clear.     Eyes:      Pupils: Pupils are equal, round, and reactive to light.       Cardiovascular:      Rate and Rhythm: Normal rate.   Pulmonary:      Effort: Pulmonary effort is normal. No respiratory distress.      Breath sounds: No stridor. No wheezing, rhonchi or rales.     Musculoskeletal:         General: Normal range of motion.      Cervical back: Normal range of motion.     Skin:     General: Skin is warm and dry.      Findings: Rash (Poison ivy dermatitis, scattered.) present.     Neurological:      General: No focal deficit present.      Mental Status: She is alert and oriented to person, place, and time. Mental status is at baseline.     Psychiatric:         Mood and Affect: Mood normal.         Behavior: Behavior normal.         Thought Content: Thought content normal.    "      Judgment: Judgment normal.         Assessment & Plan  Routine general medical examination at health care facility    Orders:    1 Year Follow Up In Primary Care - Wellness Exam    1 Year Follow Up In Primary Care - Wellness Exam; Future    Bacterial infection    Orders:    azithromycin (Zithromax) 250 mg tablet; Take 2 tabs (500 mg) by mouth today, than 1 daily for 4 days.

## 2025-09-05 ENCOUNTER — HOSPITAL ENCOUNTER (OUTPATIENT)
Dept: RADIOLOGY | Facility: CLINIC | Age: 75
Discharge: HOME | End: 2025-09-05
Payer: MEDICARE

## 2025-09-05 VITALS — BODY MASS INDEX: 21.28 KG/M2 | WEIGHT: 132.4 LBS | HEIGHT: 66 IN

## 2025-09-05 DIAGNOSIS — Z12.31 ENCOUNTER FOR SCREENING MAMMOGRAM FOR MALIGNANT NEOPLASM OF BREAST: ICD-10-CM

## 2025-09-05 PROCEDURE — 77063 BREAST TOMOSYNTHESIS BI: CPT | Performed by: RADIOLOGY

## 2025-09-05 PROCEDURE — 77067 SCR MAMMO BI INCL CAD: CPT

## 2025-09-05 PROCEDURE — 77067 SCR MAMMO BI INCL CAD: CPT | Performed by: RADIOLOGY

## 2026-02-06 ENCOUNTER — APPOINTMENT (OUTPATIENT)
Dept: PRIMARY CARE | Facility: CLINIC | Age: 76
End: 2026-02-06
Payer: MEDICARE

## 2026-08-06 ENCOUNTER — APPOINTMENT (OUTPATIENT)
Dept: PRIMARY CARE | Facility: CLINIC | Age: 76
End: 2026-08-06
Payer: MEDICARE